# Patient Record
Sex: MALE | Race: WHITE | NOT HISPANIC OR LATINO | URBAN - NONMETROPOLITAN AREA
[De-identification: names, ages, dates, MRNs, and addresses within clinical notes are randomized per-mention and may not be internally consistent; named-entity substitution may affect disease eponyms.]

---

## 2022-05-26 ENCOUNTER — OFFICE VISIT (OUTPATIENT)
Dept: URBAN - NONMETROPOLITAN AREA CLINIC 14 | Facility: CLINIC | Age: 52
End: 2022-05-26
Payer: COMMERCIAL

## 2022-05-26 DIAGNOSIS — H52.4 PRESBYOPIA: Primary | ICD-10-CM

## 2022-05-26 DIAGNOSIS — H53.50 UNSPECIFIED COLOR VISION DEFICIENCIES: ICD-10-CM

## 2022-05-26 PROCEDURE — 92004 COMPRE OPH EXAM NEW PT 1/>: CPT | Performed by: OPTOMETRIST

## 2022-05-26 ASSESSMENT — INTRAOCULAR PRESSURE
OS: 16
OD: 9

## 2022-05-26 NOTE — IMPRESSION/PLAN
Impression: Unspecified color vision deficiencies: H53.50. Plan: Patient demonstrates mild red/green deficiency. No treatment is required for this deficiency. Patient is able to perform tasks associated with his job as the jobs were described to provider.